# Patient Record
Sex: MALE | Race: OTHER | NOT HISPANIC OR LATINO
[De-identification: names, ages, dates, MRNs, and addresses within clinical notes are randomized per-mention and may not be internally consistent; named-entity substitution may affect disease eponyms.]

---

## 2021-01-28 PROBLEM — Z00.00 ENCOUNTER FOR PREVENTIVE HEALTH EXAMINATION: Status: ACTIVE | Noted: 2021-01-28

## 2021-01-29 ENCOUNTER — APPOINTMENT (OUTPATIENT)
Dept: PHYSICAL MEDICINE AND REHAB | Facility: CLINIC | Age: 61
End: 2021-01-29
Payer: COMMERCIAL

## 2021-01-29 DIAGNOSIS — M51.26 OTHER INTERVERTEBRAL DISC DISPLACEMENT, LUMBAR REGION: ICD-10-CM

## 2021-01-29 DIAGNOSIS — M79.18 MYALGIA, OTHER SITE: ICD-10-CM

## 2021-01-29 DIAGNOSIS — Z60.2 PROBLEMS RELATED TO LIVING ALONE: ICD-10-CM

## 2021-01-29 DIAGNOSIS — Z78.9 OTHER SPECIFIED HEALTH STATUS: ICD-10-CM

## 2021-01-29 DIAGNOSIS — M54.16 RADICULOPATHY, LUMBAR REGION: ICD-10-CM

## 2021-01-29 PROCEDURE — 99072 ADDL SUPL MATRL&STAF TM PHE: CPT

## 2021-01-29 PROCEDURE — 20552 NJX 1/MLT TRIGGER POINT 1/2: CPT

## 2021-01-29 PROCEDURE — 99204 OFFICE O/P NEW MOD 45 MIN: CPT | Mod: 25

## 2021-01-29 PROCEDURE — 72100 X-RAY EXAM L-S SPINE 2/3 VWS: CPT

## 2021-01-29 RX ORDER — METHYLPREDNISOLONE 4 MG/1
4 TABLET ORAL
Qty: 1 | Refills: 0 | Status: ACTIVE | COMMUNITY
Start: 2021-01-29 | End: 1900-01-01

## 2021-01-29 RX ORDER — GABAPENTIN 300 MG/1
300 CAPSULE ORAL
Qty: 60 | Refills: 0 | Status: ACTIVE | COMMUNITY
Start: 2021-01-29 | End: 1900-01-01

## 2021-01-29 RX ORDER — MELOXICAM 7.5 MG/1
7.5 TABLET ORAL TWICE DAILY
Qty: 60 | Refills: 0 | Status: ACTIVE | COMMUNITY
Start: 2021-01-29 | End: 1900-01-01

## 2021-01-29 SDOH — SOCIAL STABILITY - SOCIAL INSECURITY: PROBLEMS RELATED TO LIVING ALONE: Z60.2

## 2021-02-01 RX ORDER — CYCLOBENZAPRINE HYDROCHLORIDE 5 MG/1
5 TABLET, FILM COATED ORAL
Qty: 5 | Refills: 0 | Status: ACTIVE | COMMUNITY
Start: 2021-01-26

## 2021-02-01 RX ORDER — SILDENAFIL 100 MG/1
100 TABLET, FILM COATED ORAL
Qty: 7 | Refills: 0 | Status: ACTIVE | COMMUNITY
Start: 2020-02-18

## 2021-02-01 RX ORDER — SODIUM FLUORIDE 6 MG/ML
1.1 PASTE DENTAL
Qty: 100 | Refills: 0 | Status: ACTIVE | COMMUNITY
Start: 2020-10-21

## 2021-02-01 NOTE — ASSESSMENT
[FreeTextEntry1] : He  denies being sick or having f/c, SOB, cough.  He  understands risk of immunosuppression from steroids.  Wrote down how to take meds.  Start Mobic after Medrol pack. \par \par Avoid back flexion.  Limit sitting.  Ice back often.  Lift things properly.  Massage back with tennis ball.\par \par No chiropractor today since had injection.  Can do it next wk.\par \par Can get back brace.  \par \par Does not want PT so gave exercises to do.\par \par No narcotics found on HCS in past month.  \par \par f/u 2-3 wk

## 2021-02-01 NOTE — DATA REVIEWED
[FreeTextEntry1] : office xrays of lumbar spine ap and lateral show loss of lordosis, mild facet arthritis in lower spine, no gross fractures

## 2021-02-01 NOTE — REVIEW OF SYSTEMS
[Joint Pain] : joint pain [Negative] : Heme/Lymph [Joint Stiffness] : no joint stiffness [Muscle Pain] : no muscle pain [Muscle Weakness] : no muscle weakness

## 2021-02-01 NOTE — PHYSICAL EXAM
[FreeTextEntry1] : CRUZ is a 60 year male \par Constitutional: healthy appearing, NAD, and obese\par \par LUMBAR\par ROM: flexion to 20 deg, ext to 5 deg\par \par Gait: antalgic\par \par Inspection: no erythema, warmth\par Spine: no TTP in spinous process, SI joint, sacrum\par Bony palpation: no TTP in GT\par \par Soft tissue palpation hip: no TTP in gluteus laura, medius\par Soft tissue palpation of spine:  TTP in lumbar paraspinals\par \par 5/5 bilateral HF, KE, DF, PF \par sensation intact in bilat LE\par reflexes: knee and ankle 0 bilat\par \par Special tests: neg seated SLR\par \par

## 2021-02-01 NOTE — HISTORY OF PRESENT ILLNESS
[FreeTextEntry1] : Location: back\par Quality: sharp\par Severity: severe\par Duration: few days, went to urgent care and got a pill; no toradol shot\par Timing: acute \par Context: slept on bad couch\par Aggravating Factors: walking\par Alleviating Factors: lying down\par Associated Symptoms: denies weight loss, fever, chills, change in bowel/bladder habits, redness, warmth, weakness, numbness/tingling, +radiation down right leg\par

## 2021-02-01 NOTE — PROCEDURE
[de-identified] : After informed consent,he elected to proceed with a trigger point injection into the  lumbar paraspinals bilaterally.  I confirmed no prior adverse reactions, no active infections, and no relevant allergies. \par  \par The skin was prepped in the usual sterile manner.  The sites were injected with local anesthetic followed by local needling. The injection was completed without complication and a bandage was applied. He tolerated the procedure well and was given post-injection instructions. \par  \par Cold Tx x 48 hours, analgesics prn. Medications: 0.5 ml of 1% Lidocaine per site\par \par \par

## 2021-02-25 ENCOUNTER — RX RENEWAL (OUTPATIENT)
Age: 61
End: 2021-02-25

## 2021-03-31 ENCOUNTER — RX RENEWAL (OUTPATIENT)
Age: 61
End: 2021-03-31